# Patient Record
Sex: MALE | Race: WHITE | URBAN - METROPOLITAN AREA
[De-identification: names, ages, dates, MRNs, and addresses within clinical notes are randomized per-mention and may not be internally consistent; named-entity substitution may affect disease eponyms.]

---

## 2018-03-14 ENCOUNTER — TELEPHONE (OUTPATIENT)
Dept: CARDIOLOGY CLINIC | Age: 53
End: 2018-03-14

## 2018-05-15 ENCOUNTER — OFFICE VISIT (OUTPATIENT)
Dept: CARDIOLOGY CLINIC | Age: 53
End: 2018-05-15
Payer: COMMERCIAL

## 2018-05-15 VITALS
WEIGHT: 189 LBS | HEART RATE: 70 BPM | BODY MASS INDEX: 25.6 KG/M2 | HEIGHT: 72 IN | RESPIRATION RATE: 12 BRPM | DIASTOLIC BLOOD PRESSURE: 86 MMHG | SYSTOLIC BLOOD PRESSURE: 136 MMHG

## 2018-05-15 DIAGNOSIS — I25.10 CORONARY ARTERY DISEASE INVOLVING NATIVE CORONARY ARTERY OF NATIVE HEART WITHOUT ANGINA PECTORIS: Primary | ICD-10-CM

## 2018-05-15 PROCEDURE — 99214 OFFICE O/P EST MOD 30 MIN: CPT | Performed by: INTERNAL MEDICINE

## 2018-05-15 PROCEDURE — 93000 ELECTROCARDIOGRAM COMPLETE: CPT | Performed by: INTERNAL MEDICINE

## 2018-05-15 RX ORDER — OMEPRAZOLE 10 MG/1
10 CAPSULE, DELAYED RELEASE ORAL DAILY
COMMUNITY

## 2018-05-15 RX ORDER — METOPROLOL SUCCINATE 50 MG/1
50 TABLET, EXTENDED RELEASE ORAL DAILY
Qty: 90 TABLET | Refills: 3 | Status: SHIPPED
Start: 2018-05-15 | End: 2021-06-30

## 2018-05-15 RX ORDER — LOSARTAN POTASSIUM 50 MG/1
50 TABLET ORAL DAILY
Qty: 30 TABLET | Refills: 3 | Status: SHIPPED | OUTPATIENT
Start: 2018-05-15

## 2021-06-30 ENCOUNTER — OFFICE VISIT (OUTPATIENT)
Dept: CARDIOLOGY CLINIC | Age: 56
End: 2021-06-30
Payer: COMMERCIAL

## 2021-06-30 VITALS
RESPIRATION RATE: 18 BRPM | HEART RATE: 63 BPM | HEIGHT: 72 IN | SYSTOLIC BLOOD PRESSURE: 132 MMHG | DIASTOLIC BLOOD PRESSURE: 102 MMHG | WEIGHT: 191 LBS | BODY MASS INDEX: 25.87 KG/M2

## 2021-06-30 DIAGNOSIS — I25.10 CORONARY ARTERY DISEASE INVOLVING NATIVE CORONARY ARTERY OF NATIVE HEART WITHOUT ANGINA PECTORIS: Primary | ICD-10-CM

## 2021-06-30 PROCEDURE — 93000 ELECTROCARDIOGRAM COMPLETE: CPT | Performed by: INTERNAL MEDICINE

## 2021-06-30 PROCEDURE — 99244 OFF/OP CNSLTJ NEW/EST MOD 40: CPT | Performed by: INTERNAL MEDICINE

## 2021-06-30 RX ORDER — NEBIVOLOL 2.5 MG/1
2.5 TABLET ORAL DAILY
Qty: 90 TABLET | Refills: 3 | Status: SHIPPED | OUTPATIENT
Start: 2021-06-30

## 2021-06-30 NOTE — PROGRESS NOTES
Jacinto Gomes  1965  Date of Service: 6/30/2021    Reason for Consultation: We were asked to see Jacinto Gomes by Dr. Ambrocio Casey MD  regarding reestablishing with cardiology. History of Chief Complaint: This is a 63 y.o. male with a history of coronary artery disease and an ischemic cardiomyopathy. He is performing all normal activities including going up multiple flights of stairs at a hospital, doing yard work, housework, and shopping. He denies any chest discomfort, dyspnea on exertion, orthopnea/PND. He denies any palpitations, or syncope, or near syncope. REVIEW OF SYSTEMS:  As above. See patient questionair for further review of systems. CURRENT MEDICATIONS:  Current Outpatient Medications   Medication Sig Dispense Refill    nebivolol (BYSTOLIC) 2.5 MG tablet Take 1 tablet by mouth daily 90 tablet 3    omeprazole (PRILOSEC) 10 MG delayed release capsule Take 10 mg by mouth daily      losartan (COZAAR) 50 MG tablet Take 1 tablet by mouth daily 30 tablet 3    atorvastatin (LIPITOR) 40 MG tablet Take 20 mg by mouth daily       aspirin 81 MG EC tablet Take 1 tablet by mouth daily. 30 tablet 3     No current facility-administered medications for this visit.         ALLERGIES:  No Known Allergies    MEDICAL HISTORY:  Past Medical History:   Diagnosis Date    CAD (coronary artery disease)     Heart attack ([address]     Ischemic cardiomyopathy     Mitral regurgitation     mild       SURGICAL HISTORY:  Past Surgical History:   Procedure Laterality Date    CORONARY ANGIOPLASTY      CORONARY ANGIOPLASTY WITH STENT PLACEMENT  1/11/2014    xience 3.0 x18  MID LAD by Dr Mir Knox CATH LAB PROCEDURE      TONSILLECTOMY         FAMILY HISTORY:  Family History   Problem Relation Age of Onset    Cancer Father        SOCIAL HISTORY:  Social History     Socioeconomic History    Marital status:      Spouse name: None    Number of children: None    Years of education: None    Highest education level: None   Occupational History    None   Tobacco Use    Smoking status: Never Smoker    Smokeless tobacco: Never Used   Substance and Sexual Activity    Alcohol use: No    Drug use: No    Sexual activity: Yes   Other Topics Concern    None   Social History Narrative    None     Social Determinants of Health     Financial Resource Strain:     Difficulty of Paying Living Expenses:    Food Insecurity:     Worried About Running Out of Food in the Last Year:     Ran Out of Food in the Last Year:    Transportation Needs:     Lack of Transportation (Medical):  Lack of Transportation (Non-Medical):    Physical Activity:     Days of Exercise per Week:     Minutes of Exercise per Session:    Stress:     Feeling of Stress :    Social Connections:     Frequency of Communication with Friends and Family:     Frequency of Social Gatherings with Friends and Family:     Attends Gnosticism Services:     Active Member of Clubs or Organizations:     Attends Club or Organization Meetings:     Marital Status:    Intimate Partner Violence:     Fear of Current or Ex-Partner:     Emotionally Abused:     Physically Abused:     Sexually Abused:        PHYSICAL EXAM:  Vitals:    06/30/21 1212 06/30/21 1214   BP: (!) 128/98 (!) 132/102   Pulse: 63    Resp: 18    Weight: 191 lb (86.6 kg)    Height: 6' (1.829 m)        GENERAL:  He is alert and oriented x 3, communicates well, in no distress. NECK:  No masses, trachea is mid position. Supple, full ROM, no JVD or bruits. No palpable thyromegaly or lymphadenopathy. HEART:  Regular rate and rhythm. Normal S1 and S2. There are no abnormal murmurs or gallops. No heaves. LUNGS:  Clear to auscultation bilaterally. No use of accessory muscles. ABDOMEN:  Soft, non-tender. Normal bowel sounds. EXTREMITIES:  Full ROM x4. No bilateral lower extremity edema. Good distal pulses. EYES:  PERRL, normal lids & conjunctiva.   No icterus. ENT: no external masses, no bleeding. Moist mucosa. Normal lips formation. NEURO: Full ROM x 4, EOMI, no tremors. SKIN:  Warm, dry and intact. Normal turgor, no petechia. Phych: alert & oriented x3. Normal  Judgement & insight. Currently not agitated or anxious. EKG: Sinus rhythm, 63 bpm, nl axis, nonspecific ST - T wave changes. Q waves in V1, V2, and 3. Assessment:   1. Coronary artery disease. Prior anterior myocardial infarction in January 2015 with a 3.0 x 18 mm drug-eluting stent to the LAD. He is currently performing significant physical activities and has no cardiac symptoms. He has not followed with cardiology for several years. He quit taking his beta-blocker because he felt more tired on this. I repeated another blood pressure check myself and got an even higher number than the above numbers. 2. Ischemic cardiomyopathy. Well compensated and no symptoms. Recommendations:  1. I had a long discussion with him regarding the importance of blood pressure control and beta-blocker therapy with coronary artery disease, ischemic cardiomyopathy, and a prior myocardial infarction. He states he understands but would like to try a different beta-blocker to see if it has less tiredness feelings. I will try Bystolic 2.5 mg daily. 2. Continue to follow his cholesterol with Dr. Chelle Bush. Thank you for allowing me to participate in your patient's care. [address] [facility], [address] [facility]  Interventional Cardiology    Note: This report was completed using computerized voice recognition software. Every effort has been made to ensure accuracy, however; and invert and computerized transcription errors may be present.

## 2022-07-29 ENCOUNTER — OFFICE VISIT (OUTPATIENT)
Dept: CARDIOLOGY CLINIC | Age: 57
End: 2022-07-29
Payer: COMMERCIAL

## 2022-07-29 VITALS
RESPIRATION RATE: 16 BRPM | DIASTOLIC BLOOD PRESSURE: 80 MMHG | HEIGHT: 72 IN | HEART RATE: 62 BPM | WEIGHT: 192.5 LBS | BODY MASS INDEX: 26.07 KG/M2 | SYSTOLIC BLOOD PRESSURE: 130 MMHG

## 2022-07-29 DIAGNOSIS — I25.10 CORONARY ARTERY DISEASE INVOLVING NATIVE CORONARY ARTERY OF NATIVE HEART WITHOUT ANGINA PECTORIS: Primary | ICD-10-CM

## 2022-07-29 PROCEDURE — 99213 OFFICE O/P EST LOW 20 MIN: CPT | Performed by: INTERNAL MEDICINE

## 2022-07-29 PROCEDURE — 93000 ELECTROCARDIOGRAM COMPLETE: CPT | Performed by: INTERNAL MEDICINE

## 2022-07-29 NOTE — PROGRESS NOTES
Sharol Mcburney  1965  Date of Service: 7/29/2022    Patient Active Problem List    Diagnosis Date Noted    CAD (coronary artery disease)      Overview Note:     Anterior STEMI 1-11-15  LAD mid 100%  40%prox & 30-40% mid RCA  3.0x18 xience JUAN M to the LAD      Ischemic cardiomyopathy      Overview Note:     EF 45%      Mitral regurgitation      Overview Note:     mild         Social History     Socioeconomic History    Marital status:    Tobacco Use    Smoking status: Never    Smokeless tobacco: Never   Vaping Use    Vaping Use: Never used   Substance and Sexual Activity    Alcohol use: No    Drug use: No    Sexual activity: Yes       Current Outpatient Medications   Medication Sig Dispense Refill    nebivolol (BYSTOLIC) 2.5 MG tablet Take 1 tablet by mouth daily 90 tablet 3    omeprazole (PRILOSEC) 10 MG delayed release capsule Take 10 mg by mouth daily      losartan (COZAAR) 50 MG tablet Take 1 tablet by mouth daily 30 tablet 3    atorvastatin (LIPITOR) 40 MG tablet Take 20 mg by mouth daily       aspirin 81 MG EC tablet Take 1 tablet by mouth daily. 30 tablet 3     No current facility-administered medications for this visit. No Known Allergies    Chief Complaint:  Sharol Mcburney is here today for follow up and management/recomendations for CAD, ICM, MR     History of Present Illness: Sharol Mcburney states that He does house work, goes up the stairs, Does yard work, & goes shopping. He also plays with his 10year-old daughter. He denies any chest discomfort, dyspnea on exertion, orthopnea/PND, or lower extremity edema. He denies any palpitations or presyncopal symptoms. REVIEW OF SYSTEMS:  As above. Patient does not complain of any fever, chills, nausea, vomiting or diarrhea. No focal, motor or neurological deficits. No changes in his/her vision, hearing, bowel or bladder habits. He is not known to have a history of thyroid problems. No recent nose bleeds.     PHYSICAL EXAM:  Vitals:    07/29/22 1354   BP: 130/80   Pulse: 62   Resp: 16   Weight: 192 lb 8 oz (87.3 kg)   Height: 6' (1.829 m)       GENERAL:  He is alert and oriented x 3, communicates well, in no distress. NECK:  No masses, trachea is mid position. Supple, full ROM, no JVD or bruits. No palpable thyromegaly or lymphadenopathy. HEART: Mildly distant to auscultation. Regular rate and rhythm. Normal S1 and S2. There are no abnormal murmurs. LUNGS:  Clear to auscultation bilaterally. No use of accessory muscles. symmetrical excursion. Good air movement. ABDOMEN:  Soft, non-tender. Normal bowel sounds. EXTREMITIES:  Full ROM x 4. No bilateral lower extremity edema on exam.  Good distal pulses. EYES:  Extraocular muscles intact. PERRL. Normal lids & conjunctiva. ENT:  Nares are clear & not bleeding. Moist mucosa. Normal lips formation. No external masses   NEURO: no tremors, full ROM x 4, EOMI. SKIN:  Warm, dry and intact. Normal turgor. EKG: Sinus rhythm, 62 bpm, nl axis, nonspecific ST - T wave changes. Assessment:   Coronary artery disease as outlined above. No symptoms of recurring ischemia at this time. Cardiomyopathy as outlined above. Clinically euvolemic and no decompensation at this time. Hypertension, not well controled at this time. However, he states that he takes his medications in the evenings and he states he did not take his medications last evening. Hypercholesterolemia        Recommendations:  He is following the cholesterol with Dr. Radha Harmon. Given that he did not take his medications last evening I will not adjust his medications based on today's blood pressure readings. We had a long discussion regarding the importance of compliance. He states that he understands and he will continue to try to comply with his medications. Thank you for allowing me to participate in your patient's care.       [id number], [facility]  Interventional Cardiology

## 2024-07-19 ENCOUNTER — TELEPHONE (OUTPATIENT)
Dept: CARDIOLOGY CLINIC | Age: 59
End: 2024-07-19

## 2024-07-19 NOTE — TELEPHONE ENCOUNTER
Received a referral from Dr. Knutson to see patient.  Left message for patient to call the office to determine if patient is having any current cardiac complaints or if this is a routine visit.

## 2024-07-24 NOTE — TELEPHONE ENCOUNTER
Upon review of chart, patient spoke with Pre-Service today and is scheduled for yearly visit on 9/6/24 at 2:00 p.m.

## 2024-09-20 LAB
ALBUMIN: NORMAL
ALP BLD-CCNC: NORMAL U/L
ALT SERPL-CCNC: NORMAL U/L
ANION GAP SERPL CALCULATED.3IONS-SCNC: NORMAL MMOL/L
AST SERPL-CCNC: NORMAL U/L
BASOPHILS ABSOLUTE: NORMAL
BASOPHILS RELATIVE PERCENT: NORMAL
BILIRUB SERPL-MCNC: NORMAL MG/DL
BUN BLDV-MCNC: NORMAL MG/DL
CALCIUM SERPL-MCNC: NORMAL MG/DL
CHLORIDE BLD-SCNC: NORMAL MMOL/L
CHOLESTEROL, TOTAL: NORMAL
CHOLESTEROL/HDL RATIO: NORMAL
CO2: NORMAL
CREAT SERPL-MCNC: NORMAL MG/DL
EOSINOPHILS ABSOLUTE: NORMAL
EOSINOPHILS RELATIVE PERCENT: NORMAL
GFR, ESTIMATED: NORMAL
GLUCOSE BLD-MCNC: NORMAL MG/DL
HCT VFR BLD CALC: NORMAL %
HDLC SERPL-MCNC: NORMAL MG/DL
HEMOGLOBIN: NORMAL
LDL CHOLESTEROL: NORMAL
LYMPHOCYTES ABSOLUTE: NORMAL
LYMPHOCYTES RELATIVE PERCENT: NORMAL
MCH RBC QN AUTO: NORMAL PG
MCHC RBC AUTO-ENTMCNC: NORMAL G/DL
MCV RBC AUTO: NORMAL FL
MONOCYTES ABSOLUTE: NORMAL
MONOCYTES RELATIVE PERCENT: NORMAL
NEUTROPHILS ABSOLUTE: NORMAL
NEUTROPHILS RELATIVE PERCENT: NORMAL
NONHDLC SERPL-MCNC: NORMAL MG/DL
PLATELET # BLD: NORMAL 10*3/UL
PMV BLD AUTO: NORMAL FL
POTASSIUM SERPL-SCNC: NORMAL MMOL/L
PROSTATE SPECIFIC ANTIGEN: 1.9 NG/ML
RBC # BLD: NORMAL 10*6/UL
SODIUM BLD-SCNC: NORMAL MMOL/L
TOTAL PROTEIN: NORMAL
TRIGL SERPL-MCNC: NORMAL MG/DL
VLDLC SERPL CALC-MCNC: NORMAL MG/DL
WBC # BLD: NORMAL 10*3/UL

## 2024-09-25 ENCOUNTER — TELEPHONE (OUTPATIENT)
Age: 59
End: 2024-09-25

## 2024-12-20 DIAGNOSIS — I25.10 ATHEROSCLEROTIC HEART DISEASE OF NATIVE CORONARY ARTERY WITHOUT ANGINA PECTORIS: ICD-10-CM

## 2024-12-20 RX ORDER — ATORVASTATIN CALCIUM 20 MG/1
TABLET, FILM COATED ORAL
Qty: 90 TABLET | Refills: 0 | Status: SHIPPED | OUTPATIENT
Start: 2024-12-20

## 2024-12-20 NOTE — TELEPHONE ENCOUNTER
Name of Medication(s) Requested:  Requested Prescriptions     Pending Prescriptions Disp Refills    atorvastatin (LIPITOR) 20 MG tablet [Pharmacy Med Name: ATORVASTATIN 20 MG TABLET] 90 tablet 1     Sig: TAKE 1 TABLET BY MOUTH EVERY DAY AT DINNER FOR 90 DAYS       Medication is on current medication list Yes    Dosage and directions were verified? Yes    Quantity verified: 90 day supply     Pharmacy Verified?  Yes    Last Appointment:  Visit date not found    Future appts:  No future appointments.     (If no appt send self scheduling link. .REFILLAPPT)  Scheduling request sent?     [] Yes  [x] No    Does patient need updated?  [] Yes  [x] No

## 2025-02-26 ENCOUNTER — TELEPHONE (OUTPATIENT)
Age: 60
End: 2025-02-26

## 2025-02-26 NOTE — TELEPHONE ENCOUNTER
Call him and tell him to increase his Losartan from 50 mg /day to 100 mg /day and to make appt as Iv'e not seen him close to a yr now

## 2025-02-26 NOTE — TELEPHONE ENCOUNTER
Patient called to say when he was at his oral surgeon a day or so ago they told him his BP was high. He was taking his mother to the doctor today and asked them to take his BP and it is still high 170/95. He is asking if you want to increase his BP medication. He could not tell me what he takes because he was not at home. In looking at his listed medications he takes Losartan, nebivolol and aspirin for BP but it appears they were prescribed by Dr. Palacio & Dr. Lewis. Please advise

## 2025-03-05 ENCOUNTER — OFFICE VISIT (OUTPATIENT)
Age: 60
End: 2025-03-05
Payer: COMMERCIAL

## 2025-03-05 VITALS
RESPIRATION RATE: 16 BRPM | HEART RATE: 64 BPM | DIASTOLIC BLOOD PRESSURE: 89 MMHG | OXYGEN SATURATION: 94 % | BODY MASS INDEX: 26.14 KG/M2 | HEIGHT: 72 IN | SYSTOLIC BLOOD PRESSURE: 141 MMHG | WEIGHT: 193 LBS | TEMPERATURE: 99.1 F

## 2025-03-05 DIAGNOSIS — K21.9 GASTROESOPHAGEAL REFLUX DISEASE WITHOUT ESOPHAGITIS: ICD-10-CM

## 2025-03-05 DIAGNOSIS — E78.2 MIXED HYPERLIPIDEMIA: ICD-10-CM

## 2025-03-05 DIAGNOSIS — I10 BENIGN HYPERTENSION: Primary | ICD-10-CM

## 2025-03-05 DIAGNOSIS — I25.10 CORONARY ARTERY DISEASE INVOLVING NATIVE CORONARY ARTERY OF NATIVE HEART WITHOUT ANGINA PECTORIS: ICD-10-CM

## 2025-03-05 PROCEDURE — 3077F SYST BP >= 140 MM HG: CPT | Performed by: FAMILY MEDICINE

## 2025-03-05 PROCEDURE — 99214 OFFICE O/P EST MOD 30 MIN: CPT | Performed by: FAMILY MEDICINE

## 2025-03-05 PROCEDURE — 3079F DIAST BP 80-89 MM HG: CPT | Performed by: FAMILY MEDICINE

## 2025-03-05 RX ORDER — CARVEDILOL 12.5 MG/1
12.5 TABLET ORAL 2 TIMES DAILY
Qty: 180 TABLET | Refills: 3 | Status: SHIPPED | OUTPATIENT
Start: 2025-03-05

## 2025-03-05 RX ORDER — CHLORHEXIDINE GLUCONATE ORAL RINSE 1.2 MG/ML
15 SOLUTION DENTAL 2 TIMES DAILY
COMMUNITY
Start: 2024-12-23

## 2025-03-05 RX ORDER — OMEPRAZOLE 20 MG/1
20 CAPSULE, DELAYED RELEASE ORAL
Qty: 90 CAPSULE | Refills: 3 | Status: SHIPPED | OUTPATIENT
Start: 2025-03-05

## 2025-03-05 RX ORDER — TRAMADOL HYDROCHLORIDE 50 MG/1
50 TABLET ORAL EVERY 8 HOURS PRN
COMMUNITY
Start: 2024-12-23

## 2025-03-05 RX ORDER — LOSARTAN POTASSIUM 100 MG/1
100 TABLET ORAL DAILY
Qty: 90 TABLET | Refills: 1 | Status: SHIPPED | OUTPATIENT
Start: 2025-03-05

## 2025-03-05 RX ORDER — CARVEDILOL 6.25 MG/1
6.25 TABLET ORAL 2 TIMES DAILY WITH MEALS
COMMUNITY
End: 2025-03-05

## 2025-03-05 SDOH — ECONOMIC STABILITY: FOOD INSECURITY: WITHIN THE PAST 12 MONTHS, THE FOOD YOU BOUGHT JUST DIDN'T LAST AND YOU DIDN'T HAVE MONEY TO GET MORE.: NEVER TRUE

## 2025-03-05 SDOH — ECONOMIC STABILITY: FOOD INSECURITY: WITHIN THE PAST 12 MONTHS, YOU WORRIED THAT YOUR FOOD WOULD RUN OUT BEFORE YOU GOT MONEY TO BUY MORE.: NEVER TRUE

## 2025-03-05 ASSESSMENT — PATIENT HEALTH QUESTIONNAIRE - PHQ9
1. LITTLE INTEREST OR PLEASURE IN DOING THINGS: NOT AT ALL
SUM OF ALL RESPONSES TO PHQ QUESTIONS 1-9: 0
2. FEELING DOWN, DEPRESSED OR HOPELESS: NOT AT ALL
SUM OF ALL RESPONSES TO PHQ QUESTIONS 1-9: 0

## 2025-03-05 ASSESSMENT — ENCOUNTER SYMPTOMS
ALLERGIC/IMMUNOLOGIC NEGATIVE: 1
FACIAL SWELLING: 0
GASTROINTESTINAL NEGATIVE: 1
EYES NEGATIVE: 1
RECTAL PAIN: 0
SINUS PAIN: 0
RESPIRATORY NEGATIVE: 1

## 2025-03-05 NOTE — ASSESSMENT & PLAN NOTE
Chronic, at goal (stable), the patient has been taking his Prilosec over-the-counter 10 mg and was given a prescription for 20 so it may be more economical for him this is a stable condition    Orders:    omeprazole (PRILOSEC) 20 MG delayed release capsule; Take 1 capsule by mouth every morning (before breakfast)

## 2025-03-05 NOTE — PROGRESS NOTES
condition    Orders:    omeprazole (PRILOSEC) 20 MG delayed release capsule; Take 1 capsule by mouth every morning (before breakfast)      No follow-ups on file.     In summary we will check a blood pressure in 2 to 3 weeks we will get a lipid metabolic panel in the next several weeks.  I will make an appoint with his cardiologist and I told him its imperative that he see him.  I am going to see him back again in 6 months rather than 1 year to keep a close eye on things.  I want him to exercise also.  I also refilled his Prilosec 20 mg 1 a day for his GERD.  Subjective   HPI:  Patient comes in today for a follow-up of his hypertension coronary artery disease mixed hyperlipidemia.  The patient called because of blood pressures been elevated he is under a lot of stress he is taking care of his 91-year-old mother.  The patient unfortunately did not go to his cardiologist last year the cardiologist canceled once or twice but the patient never called back I told him it is imperative to see his cardiologist has not seen him since 2022 if he cancels appointment please call me and I will make the appointment for him.  At present his labs in the fall were okay other than a sugar of 109 he needs to watch his sweets and carbohydrates.  I am going to increase his blood pressure medication to Coreg 12.5 mg twice daily we increased his losartan from 50 to 100 mg the other day his pressure is not severe but it is not well-controlled I think a lot of it is stressed he is rachael watch his salt he does not drink caffeine.  At present we will recheck a lipid and a metabolic panel only the patient did have a colonoscopy in 2021 is not due for 10 years.  He is otherwise doing okay he is staying very active at work.    Review of Systems   Constitutional: Negative.    HENT: Negative.  Negative for dental problem, drooling, facial swelling, mouth sores and sinus pain.    Eyes: Negative.    Respiratory: Negative.     Cardiovascular: Negative.

## 2025-03-05 NOTE — ASSESSMENT & PLAN NOTE
This patient did not go to his cardiology consult last year because it was canceled and then he did not call back I told him I have made appointments with him in 2023 and 24 I making another 1 and it is absolutely necessary that he go to his cardiologist.  He needs to take this serious I informed him of this.    Orders:    carvedilol (COREG) 12.5 MG tablet; Take 1 tablet by mouth 2 times daily    Murali Santillan DO, Cardiology, [place]

## 2025-03-05 NOTE — ASSESSMENT & PLAN NOTE
Chronic, at goal (stable), continue current treatment plan, medication adherence emphasized, and lifestyle modifications recommended    Orders:    losartan (COZAAR) 100 MG tablet; Take 1 tablet by mouth daily    carvedilol (COREG) 12.5 MG tablet; Take 1 tablet by mouth 2 times daily    Comprehensive Metabolic Panel, Fasting; Future    Lipid, Fasting; Future

## 2025-03-05 NOTE — ASSESSMENT & PLAN NOTE
Chronic, worsening (exacerbation), changes made today: Cozaar and Coreg will be increased to 100 mg and 12.5 mg twice daily respectively    Orders:    losartan (COZAAR) 100 MG tablet; Take 1 tablet by mouth daily    carvedilol (COREG) 12.5 MG tablet; Take 1 tablet by mouth 2 times daily    Comprehensive Metabolic Panel, Fasting; Future    Lipid, Fasting; Future

## 2025-03-12 ENCOUNTER — RESULTS FOLLOW-UP (OUTPATIENT)
Age: 60
End: 2025-03-12

## 2025-03-12 DIAGNOSIS — E78.2 MIXED HYPERLIPIDEMIA: ICD-10-CM

## 2025-03-12 DIAGNOSIS — I10 BENIGN HYPERTENSION: ICD-10-CM

## 2025-03-12 LAB
ALBUMIN: 4.4 G/DL (ref 3.5–5.2)
ALP BLD-CCNC: 95 U/L (ref 40–129)
ALT SERPL-CCNC: 14 U/L (ref 0–40)
ANION GAP SERPL CALCULATED.3IONS-SCNC: 18 MMOL/L (ref 7–16)
AST SERPL-CCNC: 22 U/L (ref 0–39)
BILIRUB SERPL-MCNC: 0.6 MG/DL (ref 0–1.2)
BUN BLDV-MCNC: 13 MG/DL (ref 6–23)
CALCIUM SERPL-MCNC: 9.5 MG/DL (ref 8.6–10.2)
CHLORIDE BLD-SCNC: 108 MMOL/L (ref 98–107)
CHOLESTEROL, FASTING: 142 MG/DL
CO2: 20 MMOL/L (ref 22–29)
CREAT SERPL-MCNC: 1.1 MG/DL (ref 0.7–1.2)
GFR, ESTIMATED: 76 ML/MIN/1.73M2
GLUCOSE FASTING: 110 MG/DL (ref 74–99)
HDLC SERPL-MCNC: 33 MG/DL
LDL CHOLESTEROL: 90 MG/DL
POTASSIUM SERPL-SCNC: 4.3 MMOL/L (ref 3.5–5)
SODIUM BLD-SCNC: 146 MMOL/L (ref 132–146)
TOTAL PROTEIN: 7.5 G/DL (ref 6.4–8.3)
TRIGLYCERIDE, FASTING: 95 MG/DL
VLDLC SERPL CALC-MCNC: 19 MG/DL

## 2025-03-19 ENCOUNTER — CLINICAL SUPPORT (OUTPATIENT)
Age: 60
End: 2025-03-19

## 2025-03-19 VITALS — SYSTOLIC BLOOD PRESSURE: 128 MMHG | HEART RATE: 59 BPM | DIASTOLIC BLOOD PRESSURE: 80 MMHG

## 2025-03-19 DIAGNOSIS — I10 BENIGN HYPERTENSION: Primary | ICD-10-CM

## 2025-03-19 DIAGNOSIS — I25.10 ATHEROSCLEROTIC HEART DISEASE OF NATIVE CORONARY ARTERY WITHOUT ANGINA PECTORIS: ICD-10-CM

## 2025-03-19 RX ORDER — ATORVASTATIN CALCIUM 20 MG/1
TABLET, FILM COATED ORAL
Qty: 90 TABLET | Refills: 3 | Status: SHIPPED | OUTPATIENT
Start: 2025-03-19

## 2025-03-19 NOTE — PROGRESS NOTES
The patient is here for his 2 week BP Check. He is on Carvedilol 12.5mg BID and Losartan 100 mg Daily.     His readings are good at 122/76 12/80 and a pulse of 59.    His next appt here is 9/29/25

## 2025-03-19 NOTE — TELEPHONE ENCOUNTER
Name of Medication(s) Requested:  Requested Prescriptions     Pending Prescriptions Disp Refills    atorvastatin (LIPITOR) 20 MG tablet [Pharmacy Med Name: ATORVASTATIN 20 MG TABLET] 90 tablet 0     Sig: TAKE 1 TABLET BY MOUTH EVERY DAY AT DINNER FOR 90 DAYS       Medication is on current medication list Yes    Dosage and directions were verified? Yes    Quantity verified: 90 day supply     Pharmacy Verified?  Yes    Last Appointment:  3/5/2025    Future appts:  Future Appointments   Date Time Provider Department Center   3/19/2025  9:15 [facility]   3/27/2025 10:40 AM Murali Palacio, DO [facility]        (If no appt send self scheduling link. .REFILLAPPT)  Scheduling request sent?     [] Yes  [x] No    Does patient need updated?  [] Yes  [x] No

## 2025-03-27 ENCOUNTER — OFFICE VISIT (OUTPATIENT)
Dept: CARDIOLOGY CLINIC | Age: 60
End: 2025-03-27

## 2025-03-27 VITALS
TEMPERATURE: 97.4 F | BODY MASS INDEX: 26.38 KG/M2 | SYSTOLIC BLOOD PRESSURE: 165 MMHG | RESPIRATION RATE: 18 BRPM | WEIGHT: 194.8 LBS | OXYGEN SATURATION: 96 % | DIASTOLIC BLOOD PRESSURE: 70 MMHG | HEIGHT: 72 IN | HEART RATE: 53 BPM

## 2025-03-27 DIAGNOSIS — I34.0 MITRAL VALVE INSUFFICIENCY, UNSPECIFIED ETIOLOGY: ICD-10-CM

## 2025-03-27 DIAGNOSIS — I25.5 ISCHEMIC CARDIOMYOPATHY: ICD-10-CM

## 2025-03-27 DIAGNOSIS — R01.1 HEART MURMUR: ICD-10-CM

## 2025-03-27 DIAGNOSIS — I25.10 CORONARY ARTERY DISEASE INVOLVING NATIVE CORONARY ARTERY OF NATIVE HEART WITHOUT ANGINA PECTORIS: Primary | ICD-10-CM

## 2025-03-27 RX ORDER — MINOXIDIL 2.5 MG/1
2.5 TABLET ORAL DAILY
COMMUNITY

## 2025-03-27 RX ORDER — AMLODIPINE BESYLATE 2.5 MG/1
2.5 TABLET ORAL DAILY
Qty: 90 TABLET | Refills: 3 | Status: SHIPPED | OUTPATIENT
Start: 2025-03-27

## 2025-03-27 NOTE — PROGRESS NOTES
Austin Dahl  1965  Date of Service: 3/27/2025    Patient Active Problem List    Diagnosis Date Noted    Gastroesophageal reflux disease without esophagitis 03/05/2025    Benign hypertension 03/05/2025    Mixed hyperlipidemia 03/05/2025    CAD (coronary artery disease)      Overview Note:     Anterior STEMI 1-11-15  LAD mid 100%  40%prox & 30-40% mid RCA  3.0x18 xience JUAN M to the LAD      Ischemic cardiomyopathy      Overview Note:     EF 45%      Mitral regurgitation      Overview Note:     mild         Social History     Socioeconomic History    Marital status:      Spouse name: None    Number of children: None    Years of education: None    Highest education level: None   Tobacco Use    Smoking status: Never    Smokeless tobacco: Never   Vaping Use    Vaping status: Never Used   Substance and Sexual Activity    Alcohol use: No    Drug use: No    Sexual activity: Yes     Partners: Female     Social Drivers of Health     Food Insecurity: No Food Insecurity (3/5/2025)    Hunger Vital Sign     Worried About Running Out of Food in the Last Year: Never true     Ran Out of Food in the Last Year: Never true   Transportation Needs: No Transportation Needs (3/5/2025)    PRAPARE - Transportation     Lack of Transportation (Medical): No     Lack of Transportation (Non-Medical): No   Housing Stability: Low Risk  (3/5/2025)    Housing Stability Vital Sign     Unable to Pay for Housing in the Last Year: No     Number of Times Moved in the Last Year: 0     Homeless in the Last Year: No       Current Outpatient Medications   Medication Sig Dispense Refill    minoxidil (LONITEN) 2.5 MG tablet Take 1 tablet by mouth daily      atorvastatin (LIPITOR) 20 MG tablet TAKE 1 TABLET BY MOUTH EVERY DAY AT DINNER FOR 90 DAYS 90 tablet 3    losartan (COZAAR) 100 MG tablet Take 1 tablet by mouth daily 90 tablet 1    carvedilol (COREG) 12.5 MG tablet Take 1 tablet by mouth 2 times daily 180 tablet 3    omeprazole

## 2025-04-28 ENCOUNTER — HOSPITAL ENCOUNTER (OUTPATIENT)
Dept: CARDIOLOGY | Age: 60
Discharge: HOME | End: 2025-04-30
Attending: INTERNAL MEDICINE
Payer: COMMERCIAL

## 2025-04-28 ENCOUNTER — TELEPHONE (OUTPATIENT)
Dept: CARDIOLOGY CLINIC | Age: 60
End: 2025-04-28

## 2025-04-28 ENCOUNTER — CLINICAL SUPPORT (OUTPATIENT)
Dept: CARDIOLOGY CLINIC | Age: 60
End: 2025-04-28

## 2025-04-28 VITALS
HEIGHT: 72 IN | BODY MASS INDEX: 26.28 KG/M2 | DIASTOLIC BLOOD PRESSURE: 70 MMHG | SYSTOLIC BLOOD PRESSURE: 165 MMHG | WEIGHT: 194 LBS

## 2025-04-28 DIAGNOSIS — R01.1 HEART MURMUR: ICD-10-CM

## 2025-04-28 LAB
ECHO AO ASC DIAM: 3.4 CM
ECHO AO ASCENDING AORTA INDEX: 1.62 CM/M2
ECHO AV AREA PEAK VELOCITY: 1.7 CM2
ECHO AV AREA VTI: 2 CM2
ECHO AV AREA/BSA PEAK VELOCITY: 0.8 CM2/M2
ECHO AV AREA/BSA VTI: 1 CM2/M2
ECHO AV CUSP MM: 2.2 CM
ECHO AV MEAN GRADIENT: 4 MMHG
ECHO AV MEAN VELOCITY: 0.9 M/S
ECHO AV PEAK GRADIENT: 9 MMHG
ECHO AV PEAK VELOCITY: 1.5 M/S
ECHO AV VELOCITY RATIO: 0.53
ECHO AV VTI: 28.8 CM
ECHO BSA: 2.11 M2
ECHO EST RA PRESSURE: 3 MMHG
ECHO LA DIAMETER INDEX: 1.9 CM/M2
ECHO LA DIAMETER: 4 CM
ECHO LA VOL A-L A2C: 26 ML (ref 18–58)
ECHO LA VOL A-L A4C: 35 ML (ref 18–58)
ECHO LA VOL MOD A2C: 26 ML (ref 18–58)
ECHO LA VOL MOD A4C: 32 ML (ref 18–58)
ECHO LA VOLUME AREA LENGTH: 31 ML
ECHO LA VOLUME INDEX A-L A2C: 12 ML/M2 (ref 16–34)
ECHO LA VOLUME INDEX A-L A4C: 17 ML/M2 (ref 16–34)
ECHO LA VOLUME INDEX AREA LENGTH: 15 ML/M2 (ref 16–34)
ECHO LA VOLUME INDEX MOD A2C: 12 ML/M2 (ref 16–34)
ECHO LA VOLUME INDEX MOD A4C: 15 ML/M2 (ref 16–34)
ECHO LV EF PHYSICIAN: 45 %
ECHO LV EJECTION FRACTION A2C: 51 %
ECHO LV EJECTION FRACTION A4C: 50 %
ECHO LV FRACTIONAL SHORTENING: 26 % (ref 28–44)
ECHO LV INTERNAL DIMENSION DIASTOLE INDEX: 2.24 CM/M2
ECHO LV INTERNAL DIMENSION DIASTOLIC: 4.7 CM (ref 4.2–5.9)
ECHO LV INTERNAL DIMENSION SYSTOLIC INDEX: 1.67 CM/M2
ECHO LV INTERNAL DIMENSION SYSTOLIC: 3.5 CM
ECHO LV ISOVOLUMETRIC RELAXATION TIME (IVRT): 107.3 MS
ECHO LV IVSD: 1.4 CM (ref 0.6–1)
ECHO LV IVSS: 1.6 CM
ECHO LV MASS 2D: 265.2 G (ref 88–224)
ECHO LV MASS INDEX 2D: 126.3 G/M2 (ref 49–115)
ECHO LV POSTERIOR WALL DIASTOLIC: 1.4 CM (ref 0.6–1)
ECHO LV POSTERIOR WALL SYSTOLIC: 1.5 CM
ECHO LV RELATIVE WALL THICKNESS RATIO: 0.6
ECHO LVOT AREA: 3.1 CM2
ECHO LVOT AV VTI INDEX: 0.65
ECHO LVOT DIAM: 2 CM
ECHO LVOT MEAN GRADIENT: 1 MMHG
ECHO LVOT PEAK GRADIENT: 2 MMHG
ECHO LVOT PEAK VELOCITY: 0.8 M/S
ECHO LVOT STROKE VOLUME INDEX: 28 ML/M2
ECHO LVOT SV: 58.7 ML
ECHO LVOT VTI: 18.7 CM
ECHO MV "A" WAVE DURATION: 143 MSEC
ECHO MV A VELOCITY: 0.81 M/S
ECHO MV AREA PHT: 2.1 CM2
ECHO MV AREA VTI: 2.1 CM2
ECHO MV E DECELERATION TIME (DT): 208.8 MS
ECHO MV E VELOCITY: 0.77 M/S
ECHO MV E/A RATIO: 0.95
ECHO MV LVOT VTI INDEX: 1.5
ECHO MV MAX VELOCITY: 0.9 M/S
ECHO MV MEAN GRADIENT: 1 MMHG
ECHO MV MEAN VELOCITY: 0.4 M/S
ECHO MV PEAK GRADIENT: 3 MMHG
ECHO MV PRESSURE HALF TIME (PHT): 102.9 MS
ECHO MV VTI: 28 CM
ECHO PV MAX VELOCITY: 1 M/S
ECHO PV MEAN GRADIENT: 2 MMHG
ECHO PV MEAN VELOCITY: 0.6 M/S
ECHO PV PEAK GRADIENT: 4 MMHG
ECHO PV VTI: 19.1 CM
ECHO PVEIN A DURATION: 129.2 MS
ECHO PVEIN A VELOCITY: 0.2 M/S
ECHO PVEIN PEAK D VELOCITY: 0.4 M/S
ECHO PVEIN PEAK S VELOCITY: 0.7 M/S
ECHO PVEIN S/D RATIO: 1.8
ECHO RIGHT VENTRICULAR SYSTOLIC PRESSURE (RVSP): 30 MMHG
ECHO RV INTERNAL DIMENSION: 3.5 CM
ECHO TV REGURGITANT MAX VELOCITY: 2.62 M/S
ECHO TV REGURGITANT PEAK GRADIENT: 27 MMHG

## 2025-04-28 PROCEDURE — 93306 TTE W/DOPPLER COMPLETE: CPT | Performed by: INTERNAL MEDICINE

## 2025-04-28 PROCEDURE — 93306 TTE W/DOPPLER COMPLETE: CPT

## 2025-04-28 NOTE — TELEPHONE ENCOUNTER
Patient notified of Dr. Palacio's recommendation. Patient will call back to schedule to bring his monitor in later this week.

## 2025-04-28 NOTE — PROGRESS NOTES
Patient was in for a blood pressure check per Dr. Dr castro      Sitting BP: 150/80  Sitting Pulse: 56      Standing BP: 146/82  Standing Pulse: 54        Laure Mancuso MA

## 2025-04-28 NOTE — TELEPHONE ENCOUNTER
Patient was in for a blood pressure check per Dr. Dr castro      Sitting BP: 150/80  Sitting Pulse: 56        Standing BP: 146/82  Standing Pulse: 54        Laure Mancuso MA    Norvasc 2.5 mg daily added on 3/27/25. BP/P readings from patient scanned to chart.

## 2025-04-29 ENCOUNTER — RESULTS FOLLOW-UP (OUTPATIENT)
Dept: CARDIOLOGY | Age: 60
End: 2025-04-29

## 2025-04-30 NOTE — TELEPHONE ENCOUNTER
Spoke with patient.  He is going to check his BP machine at his PCP and call our office with those readings.

## 2025-04-30 NOTE — TELEPHONE ENCOUNTER
Patient notified of echo results and Dr. Palacio's recommendations.  He is taking his monitor to his PCP to be calibrated.  He will call our office with those readings.

## 2025-05-08 ENCOUNTER — CLINICAL SUPPORT (OUTPATIENT)
Age: 60
End: 2025-05-08
Payer: COMMERCIAL

## 2025-05-08 ENCOUNTER — TELEPHONE (OUTPATIENT)
Age: 60
End: 2025-05-08

## 2025-05-08 VITALS — DIASTOLIC BLOOD PRESSURE: 90 MMHG | SYSTOLIC BLOOD PRESSURE: 138 MMHG | HEART RATE: 56 BPM

## 2025-05-08 DIAGNOSIS — I10 BENIGN HYPERTENSION: Primary | ICD-10-CM

## 2025-05-08 PROCEDURE — 3075F SYST BP GE 130 - 139MM HG: CPT | Performed by: FAMILY MEDICINE

## 2025-05-08 PROCEDURE — 3079F DIAST BP 80-89 MM HG: CPT | Performed by: FAMILY MEDICINE

## 2025-05-08 PROCEDURE — 99211 OFF/OP EST MAY X REQ PHY/QHP: CPT | Performed by: FAMILY MEDICINE

## 2025-05-08 RX ORDER — AMLODIPINE BESYLATE 5 MG/1
5 TABLET ORAL DAILY
Qty: 90 TABLET | Refills: 0 | Status: SHIPPED | OUTPATIENT
Start: 2025-05-08

## 2025-05-08 NOTE — PROGRESS NOTES
The patent had called for a BP check to verify his machine readings per the Cardiologist office felt it was reading to low.    The patient is on norvasc 2.5mg daily; coreg 12.5mg bid; cozaar 100mg daily and loniten 2.5mg daily    The machine readings 155/108; 150/105; 144/98    When I checked manually he was 144/88 and 138/90 with a pulse of 56. Next appt here 9/29/25    No upcoming appt yet scheduled with Dr. Palacio.

## 2025-05-08 NOTE — TELEPHONE ENCOUNTER
----- Message from Dr. Albino Knutson DO sent at 5/8/2025  9:35 AM EDT -----        ----- Message -----  From: hDara Rhodes MA  Sent: 5/8/2025   9:27 AM EDT  To: Albino Knutson DO    Blood Pressure Check

## 2025-05-13 NOTE — TELEPHONE ENCOUNTER
Spoke with patient.  He states he went to his PCP for a BP check on 5/8/25 and his Norvasc was increased to 5 mg daily.  He is scheduled for repeat BP check at his PCP office on 6/2/25.

## 2025-06-02 ENCOUNTER — CLINICAL SUPPORT (OUTPATIENT)
Age: 60
End: 2025-06-02

## 2025-06-02 VITALS — DIASTOLIC BLOOD PRESSURE: 60 MMHG | HEART RATE: 71 BPM | SYSTOLIC BLOOD PRESSURE: 100 MMHG

## 2025-06-02 DIAGNOSIS — I10 BENIGN HYPERTENSION: Primary | ICD-10-CM

## 2025-06-02 NOTE — PROGRESS NOTES
The patient is here for his 3 week BP Check. He is on losartan 100mg carvedilol 12.5mg and amlodipine 5mg daily. His reading are very good 106/70 left arm and 100/60 right arm with a pulse of 71. He will keep is next appt here for 9/29/25.

## 2025-08-29 DIAGNOSIS — E78.2 MIXED HYPERLIPIDEMIA: ICD-10-CM

## 2025-08-29 DIAGNOSIS — I10 BENIGN HYPERTENSION: ICD-10-CM

## 2025-08-29 RX ORDER — LOSARTAN POTASSIUM 100 MG/1
100 TABLET ORAL DAILY
Qty: 90 TABLET | Refills: 1 | Status: SHIPPED | OUTPATIENT
Start: 2025-08-29